# Patient Record
Sex: FEMALE | Race: WHITE | URBAN - METROPOLITAN AREA
[De-identification: names, ages, dates, MRNs, and addresses within clinical notes are randomized per-mention and may not be internally consistent; named-entity substitution may affect disease eponyms.]

---

## 2017-01-27 ENCOUNTER — IMPORTED ENCOUNTER (OUTPATIENT)
Dept: URBAN - METROPOLITAN AREA CLINIC 31 | Facility: CLINIC | Age: 72
End: 2017-01-27

## 2017-01-27 PROBLEM — H47.012: Noted: 2017-01-27

## 2017-01-27 PROBLEM — H04.121: Noted: 2017-01-27

## 2017-01-27 PROBLEM — Z96.1: Noted: 2017-01-27

## 2017-01-27 PROBLEM — H01.002: Noted: 2017-01-27

## 2017-01-27 PROBLEM — H25.812: Noted: 2017-01-27

## 2017-01-27 PROBLEM — H01.005: Noted: 2017-01-27

## 2017-01-27 PROBLEM — H01.001: Noted: 2017-01-27

## 2017-01-27 PROBLEM — H01.004: Noted: 2017-01-27

## 2017-01-27 PROBLEM — H26.491: Noted: 2017-01-27

## 2017-01-27 PROBLEM — H18.51: Noted: 2017-01-27

## 2017-01-27 PROBLEM — Z94.7: Noted: 2017-01-27

## 2017-01-27 PROCEDURE — 99213 OFFICE O/P EST LOW 20 MIN: CPT

## 2017-02-08 ENCOUNTER — IMPORTED ENCOUNTER (OUTPATIENT)
Dept: URBAN - METROPOLITAN AREA CLINIC 31 | Facility: CLINIC | Age: 72
End: 2017-02-08

## 2017-02-08 PROCEDURE — 92015 DETERMINE REFRACTIVE STATE: CPT

## 2017-02-08 PROCEDURE — 99080 SPECIAL REPORTS OR FORMS: CPT

## 2017-07-20 ENCOUNTER — IMPORTED ENCOUNTER (OUTPATIENT)
Dept: URBAN - METROPOLITAN AREA CLINIC 31 | Facility: CLINIC | Age: 72
End: 2017-07-20

## 2017-07-20 PROBLEM — H43.811: Noted: 2017-07-20

## 2017-07-20 PROBLEM — H47.012: Noted: 2017-07-20

## 2017-07-20 PROBLEM — Z96.1: Noted: 2017-07-20

## 2017-07-20 PROBLEM — H25.12: Noted: 2017-07-20

## 2017-07-20 PROBLEM — Z94.7: Noted: 2017-07-20

## 2017-07-20 PROCEDURE — 99214 OFFICE O/P EST MOD 30 MIN: CPT

## 2017-09-28 ENCOUNTER — IMPORTED ENCOUNTER (OUTPATIENT)
Dept: URBAN - METROPOLITAN AREA CLINIC 31 | Facility: CLINIC | Age: 72
End: 2017-09-28

## 2017-09-28 PROBLEM — G45.3: Noted: 2017-09-28

## 2017-09-28 PROBLEM — Z94.7: Noted: 2017-09-28

## 2017-09-28 PROBLEM — H25.12: Noted: 2017-09-28

## 2017-09-28 PROBLEM — Z96.1: Noted: 2017-09-28

## 2017-09-28 PROBLEM — H43.811: Noted: 2017-09-28

## 2017-09-28 PROBLEM — H47.012: Noted: 2017-09-28

## 2017-09-28 PROBLEM — H18.51: Noted: 2017-09-28

## 2017-09-28 PROCEDURE — 99214 OFFICE O/P EST MOD 30 MIN: CPT

## 2018-01-25 ENCOUNTER — IMPORTED ENCOUNTER (OUTPATIENT)
Dept: URBAN - METROPOLITAN AREA CLINIC 31 | Facility: CLINIC | Age: 73
End: 2018-01-25

## 2018-01-25 PROBLEM — Z96.1: Noted: 2018-01-25

## 2018-01-25 PROBLEM — H25.812: Noted: 2018-01-25

## 2018-01-25 PROBLEM — H18.51: Noted: 2018-01-25

## 2018-01-25 PROBLEM — H43.811: Noted: 2018-01-25

## 2018-01-25 PROBLEM — H47.012: Noted: 2018-01-25

## 2018-01-25 PROBLEM — Z94.7: Noted: 2018-01-25

## 2018-01-25 PROCEDURE — 99213 OFFICE O/P EST LOW 20 MIN: CPT

## 2018-01-25 PROCEDURE — 92250 FUNDUS PHOTOGRAPHY W/I&R: CPT

## 2019-04-05 ENCOUNTER — IMPORTED ENCOUNTER (OUTPATIENT)
Dept: URBAN - METROPOLITAN AREA CLINIC 31 | Facility: CLINIC | Age: 74
End: 2019-04-05

## 2019-04-05 PROBLEM — H25.812: Noted: 2019-04-05

## 2019-04-05 PROBLEM — Z94.7: Noted: 2019-04-05

## 2019-04-05 PROBLEM — H47.012: Noted: 2019-04-05

## 2019-04-05 PROBLEM — Z96.1: Noted: 2019-04-05

## 2019-04-05 PROBLEM — H43.811: Noted: 2019-04-05

## 2019-04-05 PROBLEM — H18.51: Noted: 2019-04-05

## 2019-04-05 PROCEDURE — 99214 OFFICE O/P EST MOD 30 MIN: CPT

## 2019-04-05 PROCEDURE — 92015 DETERMINE REFRACTIVE STATE: CPT

## 2019-05-06 ENCOUNTER — IMPORTED ENCOUNTER (OUTPATIENT)
Dept: URBAN - METROPOLITAN AREA CLINIC 31 | Facility: CLINIC | Age: 74
End: 2019-05-06

## 2019-05-06 PROBLEM — H47.012: Noted: 2019-05-06

## 2019-05-06 PROBLEM — Z94.7: Noted: 2019-05-06

## 2019-05-06 PROBLEM — H25.812: Noted: 2019-05-06

## 2019-05-06 PROBLEM — H43.811: Noted: 2019-05-06

## 2019-05-06 PROBLEM — Z96.1: Noted: 2019-05-06

## 2019-05-06 PROCEDURE — 99214 OFFICE O/P EST MOD 30 MIN: CPT

## 2019-05-13 ENCOUNTER — NEW PATIENT (OUTPATIENT)
Dept: URBAN - METROPOLITAN AREA CLINIC 26 | Facility: CLINIC | Age: 74
End: 2019-05-13

## 2019-05-13 VITALS
SYSTOLIC BLOOD PRESSURE: 137 MMHG | BODY MASS INDEX: 17.42 KG/M2 | HEIGHT: 67 IN | HEART RATE: 58 BPM | DIASTOLIC BLOOD PRESSURE: 77 MMHG | WEIGHT: 111 LBS

## 2019-05-13 DIAGNOSIS — H25.22: ICD-10-CM

## 2019-05-13 DIAGNOSIS — H04.123: ICD-10-CM

## 2019-05-13 DIAGNOSIS — H35.371: ICD-10-CM

## 2019-05-13 DIAGNOSIS — H02.836: ICD-10-CM

## 2019-05-13 DIAGNOSIS — H47.012: ICD-10-CM

## 2019-05-13 DIAGNOSIS — H02.833: ICD-10-CM

## 2019-05-13 DIAGNOSIS — H43.393: ICD-10-CM

## 2019-05-13 DIAGNOSIS — Z94.7: ICD-10-CM

## 2019-05-13 PROCEDURE — 92134 CPTRZ OPH DX IMG PST SGM RTA: CPT

## 2019-05-13 PROCEDURE — 92225 OPHTHALMOSCOPY (INITIAL): CPT

## 2019-05-13 PROCEDURE — 76512 OPH US DX B-SCAN: CPT

## 2019-05-13 PROCEDURE — 99204 OFFICE O/P NEW MOD 45 MIN: CPT

## 2019-05-13 ASSESSMENT — TONOMETRY
OD_IOP_MMHG: 11
OS_IOP_MMHG: 11

## 2019-05-13 ASSESSMENT — VISUAL ACUITY: OD_SC: 20/20

## 2019-08-08 ENCOUNTER — IMPORTED ENCOUNTER (OUTPATIENT)
Dept: URBAN - METROPOLITAN AREA CLINIC 31 | Facility: CLINIC | Age: 74
End: 2019-08-08

## 2019-08-08 PROBLEM — H25.812: Noted: 2019-08-08

## 2019-08-08 PROBLEM — H47.012: Noted: 2019-08-08

## 2019-08-08 PROBLEM — Z94.7: Noted: 2019-08-08

## 2019-08-08 PROBLEM — H11.31: Noted: 2019-08-08

## 2019-08-08 PROBLEM — H18.51: Noted: 2019-08-08

## 2019-08-08 PROCEDURE — 99213 OFFICE O/P EST LOW 20 MIN: CPT

## 2020-02-28 ENCOUNTER — IMPORTED ENCOUNTER (OUTPATIENT)
Dept: URBAN - METROPOLITAN AREA CLINIC 31 | Facility: CLINIC | Age: 75
End: 2020-02-28

## 2020-02-28 PROBLEM — H25.812: Noted: 2020-02-28

## 2020-02-28 PROBLEM — H47.012: Noted: 2020-02-28

## 2020-02-28 PROBLEM — H43.811: Noted: 2020-02-28

## 2020-02-28 PROBLEM — Z96.1: Noted: 2020-02-28

## 2020-02-28 PROBLEM — Z94.7: Noted: 2020-02-28

## 2020-02-28 PROCEDURE — 92015 DETERMINE REFRACTIVE STATE: CPT

## 2020-02-28 PROCEDURE — 99214 OFFICE O/P EST MOD 30 MIN: CPT

## 2020-06-29 ENCOUNTER — IMPORTED ENCOUNTER (OUTPATIENT)
Dept: URBAN - METROPOLITAN AREA CLINIC 31 | Facility: CLINIC | Age: 75
End: 2020-06-29

## 2020-06-29 PROBLEM — H47.012: Noted: 2020-06-29

## 2020-06-29 PROBLEM — H43.811: Noted: 2020-06-29

## 2020-06-29 PROBLEM — Z94.7: Noted: 2020-06-29

## 2020-06-29 PROBLEM — Z96.1: Noted: 2020-06-29

## 2020-06-29 PROBLEM — H25.812: Noted: 2020-06-29

## 2020-06-29 PROCEDURE — 99213 OFFICE O/P EST LOW 20 MIN: CPT

## 2020-10-23 ENCOUNTER — IMPORTED ENCOUNTER (OUTPATIENT)
Dept: URBAN - METROPOLITAN AREA CLINIC 31 | Facility: CLINIC | Age: 75
End: 2020-10-23

## 2020-10-23 PROBLEM — H25.812: Noted: 2020-10-23

## 2020-10-23 PROBLEM — Z96.1: Noted: 2020-10-23

## 2020-10-23 PROBLEM — H18.512: Noted: 2020-10-23

## 2020-10-23 PROBLEM — H47.012: Noted: 2020-10-23

## 2020-10-23 PROBLEM — H50.10: Noted: 2020-10-23

## 2020-10-23 PROBLEM — Z94.7: Noted: 2020-10-23

## 2020-10-23 PROCEDURE — 92015 DETERMINE REFRACTIVE STATE: CPT

## 2020-10-23 PROCEDURE — 99213 OFFICE O/P EST LOW 20 MIN: CPT

## 2021-04-23 ENCOUNTER — IMPORTED ENCOUNTER (OUTPATIENT)
Dept: URBAN - METROPOLITAN AREA CLINIC 31 | Facility: CLINIC | Age: 76
End: 2021-04-23

## 2021-04-23 PROBLEM — H25.812: Noted: 2021-04-23

## 2021-04-23 PROBLEM — H50.10: Noted: 2021-04-23

## 2021-04-23 PROBLEM — H18.512: Noted: 2021-04-23

## 2021-04-23 PROBLEM — H47.012: Noted: 2021-04-23

## 2021-04-23 PROBLEM — Z94.7: Noted: 2021-04-23

## 2021-04-23 PROBLEM — Z96.1: Noted: 2021-04-23

## 2021-04-23 PROCEDURE — 92014 COMPRE OPH EXAM EST PT 1/>: CPT

## 2022-02-02 ENCOUNTER — HOSPITAL ENCOUNTER (EMERGENCY)
Dept: HOSPITAL 82 - ED | Age: 77
Discharge: HOME | End: 2022-02-02
Payer: MEDICARE

## 2022-02-02 VITALS — WEIGHT: 101.41 LBS | HEIGHT: 66 IN | BODY MASS INDEX: 16.3 KG/M2

## 2022-02-02 VITALS — SYSTOLIC BLOOD PRESSURE: 132 MMHG | DIASTOLIC BLOOD PRESSURE: 68 MMHG
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
## 2022-02-02 DIAGNOSIS — G20: ICD-10-CM

## 2022-02-02 DIAGNOSIS — S81.812A: Primary | ICD-10-CM

## 2022-02-02 DIAGNOSIS — F02.80: ICD-10-CM

## 2022-02-02 DIAGNOSIS — I10: ICD-10-CM

## 2022-02-02 DIAGNOSIS — Z85.038: ICD-10-CM

## 2022-02-02 PROCEDURE — 0HQLXZZ REPAIR LEFT LOWER LEG SKIN, EXTERNAL APPROACH: ICD-10-PCS | Performed by: FAMILY MEDICINE

## 2022-04-02 ASSESSMENT — TONOMETRY
OS_IOP_MMHG: 10
OD_IOP_MMHG: 8
OS_IOP_MMHG: 13
OS_IOP_MMHG: 13
OS_IOP_MMHG: 12
OD_IOP_MMHG: 11
OD_IOP_MMHG: 14
OD_IOP_MMHG: 12
OS_IOP_MMHG: 12
OD_IOP_MMHG: 13
OD_IOP_MMHG: 11
OD_IOP_MMHG: 10
OD_IOP_MMHG: 11
OS_IOP_MMHG: 11
OS_IOP_MMHG: 12
OD_IOP_MMHG: 12
OD_IOP_MMHG: 12
OS_IOP_MMHG: 12
OS_IOP_MMHG: 11
OS_IOP_MMHG: 10
OD_IOP_MMHG: 14

## 2022-04-02 ASSESSMENT — VISUAL ACUITY
OD_CC: J2
OD_SC: 20/25+2
OD_CC: 20/20-3
OD_SC: 20/20-3
OD_CC: 20/25-3
OD_SC: 20/100
OD_CC: 20/20-3
OD_CC: 20/20-1
OD_CC: 20/20-2
OD_CC: 20/25
OD_CC: 20/20-2
OD_CC: 20/20-1
OD_SC: 20/25+1
OD_CC: 20/25+2
OD_SC: 20/30
OD_CC: 20/25
OD_CC: 20/30

## 2022-12-17 ENCOUNTER — HOSPITAL ENCOUNTER (INPATIENT)
Dept: HOSPITAL 82 - ED | Age: 77
LOS: 6 days | Discharge: NURSING, REHAB OR LONG-TERM CARE | DRG: 57 | End: 2022-12-23
Attending: INTERNAL MEDICINE | Admitting: INTERNAL MEDICINE
Payer: MEDICARE

## 2022-12-17 VITALS — DIASTOLIC BLOOD PRESSURE: 70 MMHG | SYSTOLIC BLOOD PRESSURE: 157 MMHG

## 2022-12-17 VITALS — DIASTOLIC BLOOD PRESSURE: 66 MMHG | SYSTOLIC BLOOD PRESSURE: 168 MMHG

## 2022-12-17 VITALS — SYSTOLIC BLOOD PRESSURE: 152 MMHG | DIASTOLIC BLOOD PRESSURE: 133 MMHG

## 2022-12-17 VITALS — DIASTOLIC BLOOD PRESSURE: 66 MMHG | SYSTOLIC BLOOD PRESSURE: 184 MMHG

## 2022-12-17 VITALS — SYSTOLIC BLOOD PRESSURE: 179 MMHG | DIASTOLIC BLOOD PRESSURE: 79 MMHG

## 2022-12-17 VITALS — WEIGHT: 101.41 LBS | HEIGHT: 66 IN | BODY MASS INDEX: 16.3 KG/M2

## 2022-12-17 VITALS — SYSTOLIC BLOOD PRESSURE: 158 MMHG | DIASTOLIC BLOOD PRESSURE: 88 MMHG

## 2022-12-17 VITALS — DIASTOLIC BLOOD PRESSURE: 80 MMHG | SYSTOLIC BLOOD PRESSURE: 180 MMHG

## 2022-12-17 VITALS — DIASTOLIC BLOOD PRESSURE: 60 MMHG | SYSTOLIC BLOOD PRESSURE: 140 MMHG

## 2022-12-17 DIAGNOSIS — Z85.038: ICD-10-CM

## 2022-12-17 DIAGNOSIS — S09.90XA: ICD-10-CM
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
## 2022-12-17 DIAGNOSIS — G20: ICD-10-CM
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
## 2022-12-17 DIAGNOSIS — Z20.822: ICD-10-CM

## 2022-12-17 DIAGNOSIS — G31.83: Primary | ICD-10-CM

## 2022-12-17 DIAGNOSIS — H54.62: ICD-10-CM

## 2022-12-17 DIAGNOSIS — I10: ICD-10-CM

## 2022-12-17 DIAGNOSIS — F02.C11: ICD-10-CM

## 2022-12-17 LAB
ALBUMIN SERPL-MCNC: 4.2 G/DL (ref 3.2–5)
ALP SERPL-CCNC: 56 U/L (ref 38–126)
ANION GAP SERPL CALCULATED.3IONS-SCNC: 9 MMOL/L
AST SERPL-CCNC: 36 U/L (ref 9–36)
BASOPHILS NFR BLD AUTO: 0 % (ref 0–3)
BILIRUB UR QL STRIP.AUTO: NEGATIVE
BUN SERPL-MCNC: 26 MG/DL (ref 8–23)
BUN/CREAT SERPL: 39
CHLORIDE SERPL-SCNC: 104 MMOL/L (ref 95–108)
CO2 SERPL-SCNC: 28 MMOL/L (ref 22–30)
COLOR UR AUTO: YELLOW
CREAT SERPL-MCNC: 0.7 MG/DL (ref 0.5–1)
EOSINOPHIL NFR BLD AUTO: 2 % (ref 0–8)
ERYTHROCYTE [DISTWIDTH] IN BLOOD BY AUTOMATED COUNT: 14.1 % (ref 11.5–15.5)
GLUCOSE UR STRIP.AUTO-MCNC: NEGATIVE MG/DL
HCT VFR BLD AUTO: 45 % (ref 37–47)
HGB BLD-MCNC: 15 G/DL (ref 12–16)
HGB UR QL STRIP.AUTO: NEGATIVE
IMM GRANULOCYTES NFR BLD: 0.6 % (ref 0–5)
KETONES UR STRIP.AUTO-MCNC: (no result) MG/DL
LEUKOCYTE ESTERASE UR QL STRIP.AUTO: NEGATIVE
LYMPHOCYTES NFR BLD: 12 % (ref 15–41)
MCH RBC QN AUTO: 30.5 PG  CALC (ref 26–32)
MCHC RBC AUTO-ENTMCNC: 33.3 G/DL CAL (ref 32–36)
MCV RBC AUTO: 91.6 FL  CALC (ref 80–100)
MONOCYTES NFR BLD AUTO: 7 % (ref 2–13)
NEUTROPHILS # BLD AUTO: 6.27 THOU/UL (ref 2–7.15)
NEUTROPHILS NFR BLD AUTO: 79 % (ref 42–76)
NITRITE UR QL STRIP.AUTO: NEGATIVE
PH UR STRIP.AUTO: 7.5 [PH] (ref 4.5–8)
PLATELET # BLD AUTO: 196 THOU/UL (ref 130–400)
POTASSIUM SERPL-SCNC: 4.6 MMOL/L (ref 3.5–5.1)
PROT SERPL-MCNC: 6.7 G/DL (ref 6.3–8.2)
PROT UR QL STRIP.AUTO: (no result) MG/DL
RBC # BLD AUTO: 4.91 MILL/UL (ref 4.2–5.6)
SODIUM SERPL-SCNC: 136 MMOL/L (ref 137–146)
SP GR UR STRIP.AUTO: 1.02
UROBILINOGEN UR QL STRIP.AUTO: 0.2 E.U./DL

## 2022-12-17 PROCEDURE — G0378 HOSPITAL OBSERVATION PER HR: HCPCS

## 2022-12-17 NOTE — NUR
PATIENT RESTING IN BED-AWAKE ALERT ORIENTED TO PERSON, PLACE AND BIRTHDATE.
BP-180/80- MANUAL. MEDICATED WITH APRESOLINE 10MG IVP AS ORDER FOR HTN VIA LAC
IV SITE. SITE IS HEALTHY WITH BLOOD RFETURN. IVF NS HUNG AND INFUSING AT
75CC/HR. MEDICATED FOR GENERALIZED PAIN WITH ULTRAM 50MG PO. PATIENT WAS ABLE
TO TAKE MED WITHOUT ANY DIFFICULTY WITH WATER. NO COUGH OR DIFFICULTY
SWALLOWING AT THIS TIME. PATIENT WITH TELE MONITOR IN PLACE- SR-70'S. LUNGS
ARE CLEAR. ABD IS SOFT WITH ACTIVE BS. PATIENT WITH EQUAL HAND GRASPS AND ABLE
TO MOVE ALL EXTREMITIES. HIGH ANXIETY AT TIMES. 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
NEW. NO PERIPHERAL EDEMA NOTED AND PULSES ARE PALPABLE. SAFETY PRECAUTIONS
REINFORCED. BED ALARM IN PLACE FOR PATIENT SAFETY. CALL LIGHT IN REACH. WILL
CONT TO MONITOR.

## 2022-12-17 NOTE — NUR
PATIENT RESTING IN BED-DAUGHTER AT BEDSIDE. BP IS DOWN-140/60. STATES THAT SHE
NEEDS TO URINATE AND MAX ASSIST OOB TO THE BSC. PATIENT WITH VERY SPASTIC
MOVEMENT OF ARMS AND LEGS. PATIENT WITH HX OF PARKINSONS. WAS ABLE TO VOID
MODERATE AMT OF YELLOW URINE AND ASSISTED BACK TO BED. BED ALARM IN PLACE FOR
PATIENT SAFETY. SIDERAILS ARE PADDED FOR PATIENT SAFETY. CALL LIGHT IN REACH.
WILL CONT TO MONITOR.

## 2022-12-17 NOTE — NUR
Admission Note
 
Report Given to:         HARSHIL CLEMONS
Transported by:           Wheelchair          X Stretcher
 
Transported with:        X Nurse     Transporter   X Patent IV    O2
                         X Cardiac Monitor
 
Location:                 ICU      X MS2
 
 
          
 
         TO ROOM 262.

## 2022-12-18 VITALS — SYSTOLIC BLOOD PRESSURE: 140 MMHG | DIASTOLIC BLOOD PRESSURE: 60 MMHG

## 2022-12-18 VITALS — SYSTOLIC BLOOD PRESSURE: 159 MMHG | DIASTOLIC BLOOD PRESSURE: 71 MMHG

## 2022-12-18 VITALS — DIASTOLIC BLOOD PRESSURE: 62 MMHG | SYSTOLIC BLOOD PRESSURE: 148 MMHG

## 2022-12-18 VITALS — SYSTOLIC BLOOD PRESSURE: 148 MMHG | DIASTOLIC BLOOD PRESSURE: 62 MMHG

## 2022-12-18 VITALS — DIASTOLIC BLOOD PRESSURE: 71 MMHG | SYSTOLIC BLOOD PRESSURE: 159 MMHG

## 2022-12-18 NOTE — NUR
PATIENT IS SITTING UP IN BED EATING. NO SXS OF DISTRESS. NO CONCERNS OR
QUESTIONS FROM PATIENT AT THIS TIME . ADVISED TO CALL IF NEEDING ANYTHING.
PATIENT VERBALIZED UNDERSTANDING.

## 2022-12-18 NOTE — NUR
PATIENT RESTING IN BED AT THIS TIME WITH DAUGHTER AT BEDSIDE. EYES ARE CLOSED
AND RESPS ARE EVEN AND UNLABORED. IVFF PATENT AND INFUSING VIA LEFT FOREARM
SITE AT 75CC/HR. TELE MONITOR IN PLACE WITH LAST READING SR-90'S. BED ALARM IN
PLACE FOR PATIENT SAFETY. CALL LIGHT IN REACH. WILL CONT TO BERNARD.

## 2022-12-18 NOTE — NUR
PATIENT IRVING RESTLESS AND AGITATED IN BED WITH DAUGHTERS AT BEDSIDE. STATES
THAT SHE HAS BEEN LIKE THIS ALL DAY WITH HALLUCINATIONS AND THRASHING ABOUT IN
THE BED. MEDICATED PATIENT WITH SINEMET AS ORDERED AND HEPARIN SQ. IV SITE TO
LEFT FOREARM REMAINS INTACT AND HEALTHY. TELE MONITOR REAPPLIED AS LEADS WERE
DISCONNECTED DUE TO RESTLESS ACTIVITY. PATIENT IS ORIENTED TO PERSON AND
BIRTHDATE ONLY. PATIENT IS MOSTLY CONFUSED TO PLACE AND TIME-SPEAKING
INAPPROPRIATELY. FAMILY REMAINS AT BEDSIDE. BED ALARM IN PLACE FOR PATIENT
SAFETY. CALL LIGHT IN REACH. WILL CONT TO MONITOR.

## 2022-12-18 NOTE — NUR
patient is starting to get aggitated. keep going in and having her lay down.
she is unsteady on her feet. bed alarm is on and notified nurse.

## 2022-12-18 NOTE — NUR
PATIENT RESTING IN BED-REMAINS EXTREMELY AGITATED, HAVING HALLUCINATIONS AND
SEVERE TREMORS OF HANDS. VERY RESTLESS. MEDICATED WITH HALDOL 2MG IVP AS
ORDERED FOR AGITATION VIA LEFT FOREARM IV SITE. SITE REMAINS HEALTHY WITH GOOD
BLOOD RETURN. TELE MONITOR REMAINS IN PLACE. BED ALARM IN PLACE FOR PATIENT
SAFETY. CALL LIGHT IN REACH. WILL CONT TO MONITOR.

## 2022-12-18 NOTE — NUR
GOT REPORT FROM NIGHT SHIFT NURSE. PATIENT ASSESSED. AOX4. PATIENT HAS FALL
PRECAUTIONS IN PLACE. CALL LIGHT AND BED SIDE TABLE WITH IN REACH. ADVISED TO
CALL IF NEEDING ANYTHING.

## 2022-12-18 NOTE — NUR
PATIENT CONT TO BE EXTREMELY AGITATED. THRASHING ABOUT IN THE BED.
HALLUCINATING AND PICKING AT LINENS AND IN SPACE. SPEAKING OUT WITHOUT MAKING
ANY SENSE. CALL PLACE TO DR. PERES AND NEW ORDERS RECIEVED. MEDICATED WITH
ATIVAN 1MG IVP VIA LEFT FOREARM SITE. SITE REMAINS HEALTHY WITH GOOD BLOOD
RETURN. INCONT OF MODERATE AMT OF U RINE AND PERICARE PROVIDED. NEW BREIF WAS
APPLIED. PATIENT CURRENTLY RESTING MORE QUIETLY. BED ALARM INP LACE FOR
PATIENT SAFETY. CALL LIGHT IN REACH. WILL CONT TO MONITOR.

## 2022-12-18 NOTE — NUR
PATIENT RESTING IN BED-IV FOUND DISLODGED WITH CATH INTACT. NEW IV STARTED TO
LEFT FOREARM-#22 GAUGE WITH GOOD BLOOD RETURN. IVF NS INFUSING AT 75CC/HR.
TELE MONITOR IN PLACE. DAUGHTER REMAINS AT BEDSIDE FOR PATIENT SAFETY. BED
ALARM IN PLACE FOR PATIENT SAFETY. CALL LIGHT IN REACH. WILL CONT TO MONITOR.

## 2022-12-18 NOTE — NUR
PATIENT RESTING IN BED WITH DAUGHTER AT BEDSIDE. EYES ARE CLOSED AND RESPS ARE
EVEN AND UNLABORED. IVF PATENT ANDINFUSING VIA LAC SITE AT 75CC/HR. SITE
REMAINS HEALTHY AT THIS TIME. TELE MONITOR IN PLACE WITH LAST READING SR-73
BED ALARM IN PLACE FOR PATIENT SAFETY. CALL LIGHT IN REACH. WILL CONT TO
MONITOR.

## 2022-12-19 VITALS — DIASTOLIC BLOOD PRESSURE: 101 MMHG | SYSTOLIC BLOOD PRESSURE: 145 MMHG

## 2022-12-19 VITALS — DIASTOLIC BLOOD PRESSURE: 96 MMHG | SYSTOLIC BLOOD PRESSURE: 121 MMHG

## 2022-12-19 VITALS — SYSTOLIC BLOOD PRESSURE: 113 MMHG | DIASTOLIC BLOOD PRESSURE: 47 MMHG

## 2022-12-19 VITALS — DIASTOLIC BLOOD PRESSURE: 80 MMHG | SYSTOLIC BLOOD PRESSURE: 114 MMHG

## 2022-12-19 VITALS — SYSTOLIC BLOOD PRESSURE: 176 MMHG | DIASTOLIC BLOOD PRESSURE: 78 MMHG

## 2022-12-19 VITALS — SYSTOLIC BLOOD PRESSURE: 137 MMHG | DIASTOLIC BLOOD PRESSURE: 55 MMHG

## 2022-12-19 VITALS — SYSTOLIC BLOOD PRESSURE: 121 MMHG | DIASTOLIC BLOOD PRESSURE: 96 MMHG

## 2022-12-19 LAB
ALBUMIN SERPL-MCNC: 3.8 G/DL (ref 3.2–5)
ALP SERPL-CCNC: 57 U/L (ref 38–126)
ANION GAP SERPL CALCULATED.3IONS-SCNC: 9 MMOL/L
AST SERPL-CCNC: 39 U/L (ref 9–36)
BASOPHILS NFR BLD AUTO: 0 % (ref 0–3)
BUN SERPL-MCNC: 17 MG/DL (ref 8–23)
BUN/CREAT SERPL: 30
CHLORIDE SERPL-SCNC: 108 MMOL/L (ref 95–108)
CO2 SERPL-SCNC: 25 MMOL/L (ref 22–30)
CREAT SERPL-MCNC: 0.6 MG/DL (ref 0.5–1)
EOSINOPHIL NFR BLD AUTO: 2 % (ref 0–8)
ERYTHROCYTE [DISTWIDTH] IN BLOOD BY AUTOMATED COUNT: 13.8 % (ref 11.5–15.5)
HCT VFR BLD AUTO: 44 % (ref 37–47)
HGB BLD-MCNC: 14.7 G/DL (ref 12–16)
IMM GRANULOCYTES NFR BLD: 0.2 % (ref 0–5)
LYMPHOCYTES NFR BLD: 11 % (ref 15–41)
MCH RBC QN AUTO: 30.5 PG  CALC (ref 26–32)
MCHC RBC AUTO-ENTMCNC: 33.4 G/DL CAL (ref 32–36)
MCV RBC AUTO: 91.3 FL  CALC (ref 80–100)
MONOCYTES NFR BLD AUTO: 9 % (ref 2–13)
NEUTROPHILS # BLD AUTO: 4.96 THOU/UL (ref 2–7.15)
NEUTROPHILS NFR BLD AUTO: 78 % (ref 42–76)
PLATELET # BLD AUTO: 184 THOU/UL (ref 130–400)
POTASSIUM SERPL-SCNC: 4 MMOL/L (ref 3.5–5.1)
PROT SERPL-MCNC: 5.8 G/DL (ref 6.3–8.2)
RBC # BLD AUTO: 4.82 MILL/UL (ref 4.2–5.6)
SODIUM SERPL-SCNC: 137 MMOL/L (ref 137–146)

## 2022-12-19 NOTE — NUR
RECIEVED BEDSIDE REPORT FROM DAYSHIFT NURSE. PT IS VERY AGITATED AND CONFUSED.
PT TRYING TO CLIIMB OUT OF BED, TAKING OFF TELE MONITOR, PULLING AT IV. IV
SITE WAS WRAPPED WITH COBAN. BED ALARM ON AND SAFETY PRECAUTIONS IN PLACE
INFORMED  OF PT BEHAVIOR. MEDICATION ORDERED THROUGH [facility] AND FAXED
TO PHARMACY. PT DAUGHTER WAS INFORMED OF CURRENT BEHAVIOR AND REQUESTED
MEDICATION NOT BE USED. PT BEHAVIOR UNCHANGED AT THIS MOMENT. DAUGHTER IN ROOM
WITH PT. SAFETY PRECAUTIONS IN PLACE AND WILL CONTINUE TO MONITOR PT.

## 2022-12-19 NOTE — NUR
PATIENT RESTING IN BED WITH EYES CLOSED. RESPS ARE EVEN AND UNLABORED. MUCH
CALMER AT THIS TIME. TELE MONITOR IN PLACE. BED ALARM IN PLACE FOR PATIENT
SAFETY. CALL LIGHT IN REACH. WILL CONT TO MONITOR.

## 2022-12-19 NOTE — NUR
PT HAS BEEN TRANSPORTED TO MRI. PT REQUIRES ASSISTANCE FROM BED TO W/C. PT
DAUGHTER IS WITH DURING TRANSPORT AND IS ALSO WAITING IN MRI.

## 2022-12-19 NOTE — NUR
PT BECOMING EXTREMELY AGITATED, PT WILL BE MEDICATED PROPERLY. PT HAS BED
ALARM ACTIVE FOR FURTHER SAFETY. WILL CONTINUE TON MONITOR.

## 2022-12-19 NOTE — NUR
PT IS IN BED EYES CLOSED AND HAVING HALLUCINATIONS. PT UNABLE TO ANSWER
ORIENTATION QUESTIONS, AND FOLLOWS MINIMAL COMMANDS, PT HAS NO S/SX OF PAIN,
SOB. PT VS ASSESSED. PT IS ON FALL PRECAUTIONS, BED ALARM ACTIVE. WILL
CONTINUE TO MONITOR.

## 2022-12-19 NOTE — NUR
PATIENT CONTINUES TO REST QUIETLY IN BED. EYES CLOSED AND RESPS ARE EVEN AND
UNLBAORED. TELE MONITOR IN PLACE. IVF NS PATENT AND INFUSING VIA LEFT FOREARM
SITE AT 75CC/HR. BED ALARM IN PLACE FOR PATIENT SAFETY. CALL LIGHT IN REACH.
WILL CONT TO MONITOR.

## 2022-12-19 NOTE — NUR
PT IS IN BED FIDGETING IN BED WITH BLANKETS, AND GOWN. PT NOT FOLLOWING BASIC
COMMANDS AT THIS TIME. PT IS ON FALL PRECAUTIONS AND BED ALARM IS ACTIVE.
DAUGHTER IS AT BEDSIDE.

## 2022-12-20 VITALS — SYSTOLIC BLOOD PRESSURE: 153 MMHG | DIASTOLIC BLOOD PRESSURE: 72 MMHG

## 2022-12-20 VITALS — SYSTOLIC BLOOD PRESSURE: 154 MMHG | DIASTOLIC BLOOD PRESSURE: 69 MMHG

## 2022-12-20 VITALS — SYSTOLIC BLOOD PRESSURE: 142 MMHG | DIASTOLIC BLOOD PRESSURE: 70 MMHG

## 2022-12-20 VITALS — DIASTOLIC BLOOD PRESSURE: 79 MMHG | SYSTOLIC BLOOD PRESSURE: 186 MMHG

## 2022-12-20 VITALS — DIASTOLIC BLOOD PRESSURE: 72 MMHG | SYSTOLIC BLOOD PRESSURE: 153 MMHG

## 2022-12-20 VITALS — DIASTOLIC BLOOD PRESSURE: 74 MMHG | SYSTOLIC BLOOD PRESSURE: 111 MMHG

## 2022-12-20 VITALS — DIASTOLIC BLOOD PRESSURE: 65 MMHG | SYSTOLIC BLOOD PRESSURE: 144 MMHG

## 2022-12-20 LAB
ALBUMIN SERPL-MCNC: 4.2 G/DL (ref 3.2–5)
ALP SERPL-CCNC: 64 U/L (ref 38–126)
ANION GAP SERPL CALCULATED.3IONS-SCNC: 12 MMOL/L
AST SERPL-CCNC: 44 U/L (ref 9–36)
BASOPHILS NFR BLD AUTO: 0 % (ref 0–3)
BUN SERPL-MCNC: 18 MG/DL (ref 8–23)
BUN/CREAT SERPL: 32
CHLORIDE SERPL-SCNC: 106 MMOL/L (ref 95–108)
CO2 SERPL-SCNC: 28 MMOL/L (ref 22–30)
CREAT SERPL-MCNC: 0.6 MG/DL (ref 0.5–1)
EOSINOPHIL NFR BLD AUTO: 3 % (ref 0–8)
ERYTHROCYTE [DISTWIDTH] IN BLOOD BY AUTOMATED COUNT: 14 % (ref 11.5–15.5)
HCT VFR BLD AUTO: 44.5 % (ref 37–47)
HGB BLD-MCNC: 14.9 G/DL (ref 12–16)
IMM GRANULOCYTES NFR BLD: 0 % (ref 0–5)
LYMPHOCYTES NFR BLD: 15 % (ref 15–41)
MCH RBC QN AUTO: 31 PG  CALC (ref 26–32)
MCHC RBC AUTO-ENTMCNC: 33.5 G/DL CAL (ref 32–36)
MCV RBC AUTO: 92.7 FL  CALC (ref 80–100)
MONOCYTES NFR BLD AUTO: 10 % (ref 2–13)
NEUTROPHILS # BLD AUTO: 4.27 THOU/UL (ref 2–7.15)
NEUTROPHILS NFR BLD AUTO: 72 % (ref 42–76)
PLATELET # BLD AUTO: 201 THOU/UL (ref 130–400)
POTASSIUM SERPL-SCNC: 3.8 MMOL/L (ref 3.5–5.1)
PROT SERPL-MCNC: 6.6 G/DL (ref 6.3–8.2)
RBC # BLD AUTO: 4.8 MILL/UL (ref 4.2–5.6)
SODIUM SERPL-SCNC: 143 MMOL/L (ref 137–146)

## 2022-12-20 NOTE — NUR
PT HAS BEEN RESTLESS, DAUGHTER ARRIVED AND PT HAS SEEMED TO BECOME MORE
RELAXED. PT HAS SITTER NEARBY, PT HAS BED ALARM IN PLACE. WILL CONTINUE TO
MONITOR.

## 2022-12-20 NOTE — NUR
PT LAYING SUPINE IN BED. DAUGHTER IN ROOM AT BEDISDE WITH PT. PT STILL
CONFUSED WITH MUMBLED SPEECH BUT NO SIGNS OF HIGH ANXIETY. NO S/S OF DISTRESS
AND DENIES ANY PAIN AT THIS TIME. SAFETY PRECAUTIONS IN PLACE AND CALL LIGHT
WITHIN REACH.

## 2022-12-20 NOTE — NUR
PT IN BED SLEEPING LAYING SUPINE. NO S/S OF DISCOMOFRT. DAUGHTER AT BEDSIDE.
[place]LEY LIGHT WITHIN REACH AND SAFETY PRECAUTIONS IN PLACE.

## 2022-12-20 NOTE — NUR
PT IS SITTING UP IN BED, AND TRYING TO CONTINUOUSLY GET OOB, SPOKE TO NP AND
AWAITING ORDERS. PT IS UNABLE TO ANSWER ORIENTATION QUESTIONS, NOT FOLLOWING
COMMANDS AND HAS BEEN REPOSITIONED IN BED, PT HAS BEEN PLACED ON FALL
PRECAUTIONS AND BED ALARM IS ACTIVE, SITTER IS AT BEDSIDE. WILL CONTINUE TO
MONITOR.

## 2022-12-20 NOTE — NUR
PT BECOMING RESTLESS, AND IS CLIMBING OVER BED RAILS, SITTER AND DAUGHTER
PRESENT. PT HAS BEEN MEDICATED PER DR ORDER. PT IS BEING MONITORED.

## 2022-12-20 NOTE — NUR
Pt in bed in iyer position, CNA taking vs. She was unable to answer any
questions, She was talking some but difficult to understand. Pt not following
commands and became agitated with movement. Pt not appropriate for treatment
at this time.

## 2022-12-21 VITALS — DIASTOLIC BLOOD PRESSURE: 60 MMHG | SYSTOLIC BLOOD PRESSURE: 103 MMHG

## 2022-12-21 VITALS — DIASTOLIC BLOOD PRESSURE: 102 MMHG | SYSTOLIC BLOOD PRESSURE: 118 MMHG

## 2022-12-21 VITALS — SYSTOLIC BLOOD PRESSURE: 103 MMHG | DIASTOLIC BLOOD PRESSURE: 60 MMHG

## 2022-12-21 VITALS — DIASTOLIC BLOOD PRESSURE: 61 MMHG | SYSTOLIC BLOOD PRESSURE: 130 MMHG

## 2022-12-21 VITALS — DIASTOLIC BLOOD PRESSURE: 79 MMHG | SYSTOLIC BLOOD PRESSURE: 186 MMHG

## 2022-12-21 VITALS — DIASTOLIC BLOOD PRESSURE: 65 MMHG | SYSTOLIC BLOOD PRESSURE: 132 MMHG

## 2022-12-21 VITALS — SYSTOLIC BLOOD PRESSURE: 156 MMHG | DIASTOLIC BLOOD PRESSURE: 58 MMHG

## 2022-12-21 VITALS — SYSTOLIC BLOOD PRESSURE: 130 MMHG | DIASTOLIC BLOOD PRESSURE: 61 MMHG

## 2022-12-21 LAB
ALBUMIN SERPL-MCNC: 4.1 G/DL (ref 3.2–5)
ALP SERPL-CCNC: 64 U/L (ref 38–126)
ANION GAP SERPL CALCULATED.3IONS-SCNC: 14 MMOL/L
AST SERPL-CCNC: 39 U/L (ref 9–36)
BASOPHILS NFR BLD AUTO: 0.3 % (ref 0–3)
BUN SERPL-MCNC: 24 MG/DL (ref 8–23)
BUN/CREAT SERPL: 33
CHLORIDE SERPL-SCNC: 107 MMOL/L (ref 95–108)
CO2 SERPL-SCNC: 24 MMOL/L (ref 22–30)
CREAT SERPL-MCNC: 0.7 MG/DL (ref 0.5–1)
EOSINOPHIL NFR BLD AUTO: 2.4 % (ref 0–8)
ERYTHROCYTE [DISTWIDTH] IN BLOOD BY AUTOMATED COUNT: 14.1 % (ref 11.5–15.5)
HCT VFR BLD AUTO: 47.6 % (ref 37–47)
HGB BLD-MCNC: 15.8 G/DL (ref 12–16)
IMM GRANULOCYTES NFR BLD: 0.1 % (ref 0–5)
LYMPHOCYTES NFR BLD: 13.2 % (ref 15–41)
MCH RBC QN AUTO: 30.8 PG  CALC (ref 26–32)
MCHC RBC AUTO-ENTMCNC: 33.2 G/DL CAL (ref 32–36)
MCV RBC AUTO: 92.8 FL  CALC (ref 80–100)
MONOCYTES NFR BLD AUTO: 9.9 % (ref 2–13)
NEUTROPHILS # BLD AUTO: 4.95 THOU/UL (ref 2–7.15)
NEUTROPHILS NFR BLD AUTO: 74.1 % (ref 42–76)
PLATELET # BLD AUTO: 186 THOU/UL (ref 130–400)
POTASSIUM SERPL-SCNC: 4.3 MMOL/L (ref 3.5–5.1)
PROT SERPL-MCNC: 6.4 G/DL (ref 6.3–8.2)
RBC # BLD AUTO: 5.13 MILL/UL (ref 4.2–5.6)
SODIUM SERPL-SCNC: 141 MMOL/L (ref 137–146)

## 2022-12-21 NOTE — NUR
Attempted treatment this am. Daughter at bedside and reported this was the
first pt had slept all week. Treatment held at this time.

## 2022-12-21 NOTE — NUR
PT SLEEPING, BREATHING REMAINS THE SAME. DAUGHTER AT BEDSIDE. TELE IN PLACE.
PT COVERED IN A WARM BLANKET. DENIES ANY NEEDS AT THIS TIME. ALL SAFETY
PRECAUTIONS IN PLACE AT THIS TIME

## 2022-12-21 NOTE — NUR
PT IN BED SLEEPING. DAUGHTER AT BEDSIDE. TELE IN PLACE. PT RESTLESS AND
SPEAKING TO PEOPLE ONLY THE PT CAN SEE. PT DENIES PAIN. ALL SAFETY PRECAUTIONS
IN PLACE AT THIS TIME.

## 2022-12-21 NOTE — NUR
PATIENT RESTING IN BED. RESPS ARE EVEN AND UNLABORED. PATIENT AGITATED SITTER
AT BEDSIDE. TELE MONITOR IN PLACE. BED ALARM IN PLACE FOR PATIENT SAFETY. CALL
LIGHT IN REACH. WILL CONT MONITOR. SITTER AT BED SIDE

## 2022-12-21 NOTE — NUR
PT RESTING IN BED. STATES NO PAIN. TELE MONITOR IN PLACE. CONTINOUS MONITORING
FALL/SAFTEY PRECAUTION IN PLACE, CALL LIGHT WITHIN REACH.

## 2022-12-21 NOTE — NUR
REPORT FROM SHERYL IS RECEIVED IN. ALERT AND ORIENTED PATIENT X1. RESTING IN
BED WITH EYES CLOSED. PATIENT WITH SITTER'S ORDER FOR FALL PRECAUTION. SAFETY
AND FALL PRECAUTIONS IN PLACE. CALL LIGHT WITHIN IN REACH.

## 2022-12-21 NOTE — NUR
PT TELE READING MAINTAINING IN THE 120s and HIGH ONE TEENS. PT ALSO RESTLESS
AND CALLING OUT.  INFORMED RN ON UNIT AND WAS TOLD TO MEDICATE PT WITH ATIVAN
EARLY.

## 2022-12-21 NOTE — NUR
SPEECH PATHOLOGY--
SLP ATTEMPTED TO SEE PT. NOT ABLE TO WAKE UP IN AM. WILL CONTINUE TO FOLLOW
FOR DIET TOLERANCE.

## 2022-12-22 VITALS — DIASTOLIC BLOOD PRESSURE: 75 MMHG | SYSTOLIC BLOOD PRESSURE: 145 MMHG

## 2022-12-22 LAB
ALBUMIN SERPL-MCNC: 4.1 G/DL (ref 3.2–5)
ALP SERPL-CCNC: 64 U/L (ref 38–126)
ANION GAP SERPL CALCULATED.3IONS-SCNC: 15 MMOL/L
AST SERPL-CCNC: 32 U/L (ref 9–36)
BASOPHILS NFR BLD AUTO: 0.3 % (ref 0–3)
BUN SERPL-MCNC: 43 MG/DL (ref 8–23)
BUN/CREAT SERPL: 57
CHLORIDE SERPL-SCNC: 108 MMOL/L (ref 95–108)
CO2 SERPL-SCNC: 26 MMOL/L (ref 22–30)
CREAT SERPL-MCNC: 0.8 MG/DL (ref 0.5–1)
EOSINOPHIL NFR BLD AUTO: 2.5 % (ref 0–8)
ERYTHROCYTE [DISTWIDTH] IN BLOOD BY AUTOMATED COUNT: 14.4 % (ref 11.5–15.5)
HCT VFR BLD AUTO: 45.2 % (ref 37–47)
HGB BLD-MCNC: 15.4 G/DL (ref 12–16)
IMM GRANULOCYTES NFR BLD: 0.2 % (ref 0–5)
LYMPHOCYTES NFR BLD: 19.6 % (ref 15–41)
MCH RBC QN AUTO: 31.4 PG  CALC (ref 26–32)
MCHC RBC AUTO-ENTMCNC: 34.1 G/DL CAL (ref 32–36)
MCV RBC AUTO: 92.1 FL  CALC (ref 80–100)
MONOCYTES NFR BLD AUTO: 10 % (ref 2–13)
NEUTROPHILS # BLD AUTO: 3.98 THOU/UL (ref 2–7.15)
NEUTROPHILS NFR BLD AUTO: 67.4 % (ref 42–76)
PLATELET # BLD AUTO: 212 THOU/UL (ref 130–400)
POTASSIUM SERPL-SCNC: 4 MMOL/L (ref 3.5–5.1)
PROT SERPL-MCNC: 6.6 G/DL (ref 6.3–8.2)
RBC # BLD AUTO: 4.91 MILL/UL (ref 4.2–5.6)
SODIUM SERPL-SCNC: 144 MMOL/L (ref 137–146)

## 2022-12-22 NOTE — NUR
BEDSIDE SHIFT REPORT, PT SLEELPING BUT AROUSES TO TACTILE STIMULI, CONFUSED,
NO SIGN DISCOMFORT, BREATHING EVEN AND NON-LABORED, TELE MONITOR IN PLACE,
SITTER IN ROOM, BED LOCKED IN LOWEST POSITOIN WITH CALL BELL IN REACH AND
ALARM ACTIVATED.

## 2022-12-22 NOTE — NUR
AGITATED AND RESTLESS AT 1200 REMOVING CLOTHING, NEW MED ORDEERED AND GIVEN,
WILL CONTINUE TO MONITOR.

## 2022-12-22 NOTE — NUR
Pt sleeping with CNA present. She reported 2 person assist to bath pt this am
secondary to pt restlessness. Pt was trying to get out of bed with them.
Treatment held secondary to sleeping and inability to participate.

## 2022-12-22 NOTE — NUR
PT IN BED WITH EYES CLOSED.  UNABLE TO GET VITALS DUE RESISTANT AND SNATCHING
AWAY FROM NURSE AND TECH.  DAUGHTER AT BEDSIDE.  DR MCQUEEN CALLED REGARDING
INCREASED RESTLESSNESS, AGITATION AND CLINBING OUT OF BED.  aWAITING ORDERS.
WILL CONTINUE TO OBSERVE

## 2022-12-23 VITALS — DIASTOLIC BLOOD PRESSURE: 60 MMHG | SYSTOLIC BLOOD PRESSURE: 148 MMHG

## 2022-12-23 VITALS — SYSTOLIC BLOOD PRESSURE: 151 MMHG | DIASTOLIC BLOOD PRESSURE: 68 MMHG

## 2022-12-23 VITALS — SYSTOLIC BLOOD PRESSURE: 128 MMHG | DIASTOLIC BLOOD PRESSURE: 54 MMHG

## 2022-12-23 VITALS — SYSTOLIC BLOOD PRESSURE: 148 MMHG | DIASTOLIC BLOOD PRESSURE: 60 MMHG

## 2022-12-23 LAB
ALBUMIN SERPL-MCNC: 4 G/DL (ref 3.2–5)
ALP SERPL-CCNC: 64 U/L (ref 38–126)
ANION GAP SERPL CALCULATED.3IONS-SCNC: 11 MMOL/L
AST SERPL-CCNC: 26 U/L (ref 9–36)
BASOPHILS NFR BLD AUTO: 0.4 % (ref 0–3)
BUN SERPL-MCNC: 46 MG/DL (ref 8–23)
BUN/CREAT SERPL: 68
CHLORIDE SERPL-SCNC: 109 MMOL/L (ref 95–108)
CO2 SERPL-SCNC: 26 MMOL/L (ref 22–30)
CREAT SERPL-MCNC: 0.7 MG/DL (ref 0.5–1)
EOSINOPHIL NFR BLD AUTO: 3.4 % (ref 0–8)
ERYTHROCYTE [DISTWIDTH] IN BLOOD BY AUTOMATED COUNT: 14.1 % (ref 11.5–15.5)
HCT VFR BLD AUTO: 47 % (ref 37–47)
HGB BLD-MCNC: 15.8 G/DL (ref 12–16)
IMM GRANULOCYTES NFR BLD: 0 % (ref 0–5)
LYMPHOCYTES NFR BLD: 17.6 % (ref 15–41)
MCH RBC QN AUTO: 31.2 PG  CALC (ref 26–32)
MCHC RBC AUTO-ENTMCNC: 33.6 G/DL CAL (ref 32–36)
MCV RBC AUTO: 92.9 FL  CALC (ref 80–100)
MONOCYTES NFR BLD AUTO: 9.3 % (ref 2–13)
NEUTROPHILS # BLD AUTO: 3.5 THOU/UL (ref 2–7.15)
NEUTROPHILS NFR BLD AUTO: 69.3 % (ref 42–76)
PLATELET # BLD AUTO: 220 THOU/UL (ref 130–400)
POTASSIUM SERPL-SCNC: 3.9 MMOL/L (ref 3.5–5.1)
PROT SERPL-MCNC: 6.5 G/DL (ref 6.3–8.2)
RBC # BLD AUTO: 5.06 MILL/UL (ref 4.2–5.6)
SODIUM SERPL-SCNC: 142 MMOL/L (ref 137–146)

## 2022-12-23 NOTE — NUR
pT IN BED WITH EYES CLOSED.  NO S/S OF DISTRESS DURING THE NIGHT.  DAUGHTER AT
BEDSIDE.  INCONTINENCE CARE PROVIDED AND POSITION CHANGES THROUGHOUT THE
NIGHT.  BED IN LOW POSITION AND BED ALARM ON FOR POOR SAFETY AWARENESS AND
FALL PREVENTION.  WILL CONTINUE TO OBSERVE

## 2023-01-04 ENCOUNTER — HOSPITAL ENCOUNTER (EMERGENCY)
Dept: HOSPITAL 82 - ED | Age: 78
Discharge: HOME | End: 2023-01-04
Payer: MEDICARE

## 2023-01-04 VITALS — DIASTOLIC BLOOD PRESSURE: 83 MMHG | SYSTOLIC BLOOD PRESSURE: 147 MMHG

## 2023-01-04 VITALS — SYSTOLIC BLOOD PRESSURE: 133 MMHG | DIASTOLIC BLOOD PRESSURE: 111 MMHG

## 2023-01-04 VITALS — DIASTOLIC BLOOD PRESSURE: 61 MMHG | SYSTOLIC BLOOD PRESSURE: 79 MMHG

## 2023-01-04 VITALS — WEIGHT: 83.78 LBS | HEIGHT: 66 IN | BODY MASS INDEX: 13.46 KG/M2

## 2023-01-04 VITALS — DIASTOLIC BLOOD PRESSURE: 67 MMHG | SYSTOLIC BLOOD PRESSURE: 120 MMHG

## 2023-01-04 VITALS — SYSTOLIC BLOOD PRESSURE: 157 MMHG | DIASTOLIC BLOOD PRESSURE: 94 MMHG

## 2023-01-04 VITALS — DIASTOLIC BLOOD PRESSURE: 73 MMHG | SYSTOLIC BLOOD PRESSURE: 132 MMHG

## 2023-01-04 VITALS — SYSTOLIC BLOOD PRESSURE: 161 MMHG | DIASTOLIC BLOOD PRESSURE: 79 MMHG

## 2023-01-04 VITALS — DIASTOLIC BLOOD PRESSURE: 78 MMHG | SYSTOLIC BLOOD PRESSURE: 97 MMHG
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
## 2023-01-04 DIAGNOSIS — I10: ICD-10-CM

## 2023-01-04 DIAGNOSIS — G20: ICD-10-CM
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
## 2023-01-04 DIAGNOSIS — S70.02XA: ICD-10-CM

## 2023-01-04 DIAGNOSIS — S01.01XA: Primary | ICD-10-CM

## 2023-01-04 DIAGNOSIS — I48.91: ICD-10-CM

## 2023-01-04 DIAGNOSIS — F02.80: ICD-10-CM

## 2023-08-08 ENCOUNTER — HOSPITAL ENCOUNTER (EMERGENCY)
Dept: HOSPITAL 82 - ED | Age: 78
Discharge: NURSING, REHAB OR LONG-TERM CARE | End: 2023-08-08
Payer: MEDICARE

## 2023-08-08 VITALS — SYSTOLIC BLOOD PRESSURE: 181 MMHG | DIASTOLIC BLOOD PRESSURE: 74 MMHG

## 2023-08-08 VITALS — DIASTOLIC BLOOD PRESSURE: 63 MMHG | SYSTOLIC BLOOD PRESSURE: 139 MMHG

## 2023-08-08 VITALS — SYSTOLIC BLOOD PRESSURE: 163 MMHG | DIASTOLIC BLOOD PRESSURE: 94 MMHG

## 2023-08-08 VITALS — DIASTOLIC BLOOD PRESSURE: 74 MMHG | SYSTOLIC BLOOD PRESSURE: 166 MMHG

## 2023-08-08 VITALS — SYSTOLIC BLOOD PRESSURE: 143 MMHG | DIASTOLIC BLOOD PRESSURE: 112 MMHG

## 2023-08-08 VITALS — SYSTOLIC BLOOD PRESSURE: 149 MMHG | DIASTOLIC BLOOD PRESSURE: 76 MMHG

## 2023-08-08 VITALS — DIASTOLIC BLOOD PRESSURE: 77 MMHG | SYSTOLIC BLOOD PRESSURE: 142 MMHG

## 2023-08-08 VITALS — SYSTOLIC BLOOD PRESSURE: 153 MMHG | DIASTOLIC BLOOD PRESSURE: 73 MMHG

## 2023-08-08 VITALS — SYSTOLIC BLOOD PRESSURE: 163 MMHG | DIASTOLIC BLOOD PRESSURE: 71 MMHG

## 2023-08-08 VITALS — DIASTOLIC BLOOD PRESSURE: 68 MMHG | SYSTOLIC BLOOD PRESSURE: 143 MMHG

## 2023-08-08 VITALS — DIASTOLIC BLOOD PRESSURE: 78 MMHG | SYSTOLIC BLOOD PRESSURE: 163 MMHG

## 2023-08-08 VITALS — SYSTOLIC BLOOD PRESSURE: 150 MMHG | DIASTOLIC BLOOD PRESSURE: 75 MMHG

## 2023-08-08 VITALS — SYSTOLIC BLOOD PRESSURE: 162 MMHG | DIASTOLIC BLOOD PRESSURE: 87 MMHG

## 2023-08-08 VITALS — SYSTOLIC BLOOD PRESSURE: 141 MMHG | DIASTOLIC BLOOD PRESSURE: 77 MMHG

## 2023-08-08 VITALS — SYSTOLIC BLOOD PRESSURE: 153 MMHG | DIASTOLIC BLOOD PRESSURE: 67 MMHG

## 2023-08-08 VITALS — DIASTOLIC BLOOD PRESSURE: 76 MMHG | SYSTOLIC BLOOD PRESSURE: 168 MMHG

## 2023-08-08 VITALS — SYSTOLIC BLOOD PRESSURE: 167 MMHG | DIASTOLIC BLOOD PRESSURE: 75 MMHG

## 2023-08-08 VITALS — DIASTOLIC BLOOD PRESSURE: 77 MMHG | SYSTOLIC BLOOD PRESSURE: 150 MMHG

## 2023-08-08 VITALS — DIASTOLIC BLOOD PRESSURE: 75 MMHG | SYSTOLIC BLOOD PRESSURE: 161 MMHG

## 2023-08-08 VITALS — DIASTOLIC BLOOD PRESSURE: 122 MMHG | SYSTOLIC BLOOD PRESSURE: 160 MMHG

## 2023-08-08 VITALS — DIASTOLIC BLOOD PRESSURE: 77 MMHG | SYSTOLIC BLOOD PRESSURE: 159 MMHG

## 2023-08-08 VITALS — SYSTOLIC BLOOD PRESSURE: 157 MMHG | DIASTOLIC BLOOD PRESSURE: 85 MMHG

## 2023-08-08 VITALS — HEIGHT: 66 IN | WEIGHT: 93.92 LBS | BODY MASS INDEX: 15.09 KG/M2

## 2023-08-08 VITALS — DIASTOLIC BLOOD PRESSURE: 74 MMHG | SYSTOLIC BLOOD PRESSURE: 155 MMHG

## 2023-08-08 DIAGNOSIS — G20: ICD-10-CM

## 2023-08-08 DIAGNOSIS — N39.0: Primary | ICD-10-CM

## 2023-08-08 DIAGNOSIS — I48.91: ICD-10-CM

## 2023-08-08 DIAGNOSIS — U07.1: ICD-10-CM

## 2023-08-08 DIAGNOSIS — F02.80: ICD-10-CM

## 2023-08-08 DIAGNOSIS — B96.20: ICD-10-CM

## 2023-08-08 DIAGNOSIS — I10: ICD-10-CM

## 2023-08-08 LAB
ALBUMIN SERPL-MCNC: 3.4 G/DL (ref 3.2–5)
ALP SERPL-CCNC: 56 U/L (ref 38–126)
ANION GAP SERPL CALCULATED.3IONS-SCNC: 10 MMOL/L
AST SERPL-CCNC: 44 U/L (ref 9–36)
BACTERIA #/AREA URNS HPF: (no result) HPF
BASOPHILS NFR BLD AUTO: 0.5 % (ref 0–3)
BUN SERPL-MCNC: 32 MG/DL (ref 8–23)
BUN/CREAT SERPL: 53
CHLORIDE SERPL-SCNC: 102 MMOL/L (ref 95–108)
CO2 SERPL-SCNC: 32 MMOL/L (ref 22–30)
CREAT SERPL-MCNC: 0.6 MG/DL (ref 0.5–1)
EOSINOPHIL NFR BLD AUTO: 1.4 % (ref 0–8)
ERYTHROCYTE [DISTWIDTH] IN BLOOD BY AUTOMATED COUNT: 13.6 % (ref 11.5–15.5)
HCT VFR BLD AUTO: 43.9 % (ref 37–47)
HGB BLD-MCNC: 14.3 G/DL (ref 12–16)
IMM GRANULOCYTES NFR BLD: 0 % (ref 0–5)
KETONES UR STRIP.AUTO-MCNC: 40 MG/DL
LYMPHOCYTES NFR BLD: 20.5 % (ref 15–41)
MCH RBC QN AUTO: 31.1 PG  CALC (ref 26–32)
MCHC RBC AUTO-ENTMCNC: 32.6 G/DL CAL (ref 32–36)
MCV RBC AUTO: 95.4 FL  CALC (ref 80–100)
MONOCYTES NFR BLD AUTO: 9.3 % (ref 2–13)
NEUTROPHILS # BLD AUTO: 2.49 THOU/UL (ref 2–7.15)
NEUTROPHILS NFR BLD AUTO: 68.3 % (ref 42–76)
PH UR STRIP.AUTO: 6 [PH] (ref 4.5–8)
PLATELET # BLD AUTO: 142 THOU/UL (ref 130–400)
POTASSIUM SERPL-SCNC: 3.8 MMOL/L (ref 3.5–5.1)
PROT SERPL-MCNC: 6.6 G/DL (ref 6.3–8.2)
PROT UR QL STRIP.AUTO: 30 MG/DL
RBC # BLD AUTO: 4.6 MILL/UL (ref 4.2–5.6)
RBC #/AREA URNS HPF: (no result) RBC/HPF (ref 0–5)
SODIUM SERPL-SCNC: 140 MMOL/L (ref 137–146)
SP GR UR STRIP.AUTO: 1.02
SQUAMOUS URNS QL MICRO: (no result) EPI/HPF
UROBILINOGEN UR QL STRIP.AUTO: 1 E.U./DL
WBC #/AREA URNS HPF: (no result) WBC/HPF (ref 0–5)

## 2024-03-30 NOTE — NUR
NEUROLOGY CONSULT NOTE    ADMISSION DATE:  3/29/2024  CONSULTING PHYSICIAN:  Óscar Woodward MD  REQUESTING PHYSICIAN: No ref. provider found  ATTENDING PHYSICIAN:  Keyana Lamb MD  CODE STATUS:  Full Resuscitation      CHIEF COMPLAINT:  vision changes  QUESTION FOR CONSULTATION: same    SUBJECTIVE:  Jammie Dinero is a 84 year old female w pmhx notable for Parkionsons, HTN, who presents after a brief episode of blurred vision.  She described that when she looked in the mirror her face looked blurry.  She did not appreciate any focality of this blurred, rather she describes it as generalized.  She estimates that this lasted about 5-10 minutes before improving.  She describes a similar episode about 1-2 weeks ago.  He denied any other symptoms, including no double vision, no change in speech/language, no change in mental status, no unilateral sensory/motor complaints/changes.  She endorses compliance with aspirin daily.    HISTORIES:  ALLERGIES:   Allergen Reactions    Lactose   (Food Or Med) GI UPSET and Nausea & Vomiting     Other reaction(s): GI Upset/Nausea/Vomiting    Pollen Extract Other (See Comments)       Current Facility-Administered Medications   Medication Dose Route Frequency Provider Last Rate Last Admin    LORazepam (ATIVAN) tablet 1 mg  1 mg Oral PRN Óscar Woodward MD   1 mg at 03/29/24 2154    atorvastatin (LIPITOR) tablet 20 mg  20 mg Oral Daily Afshan, Shakeema A, CNP   20 mg at 03/30/24 0930    carbidopa-levodopa (SINEMET CR)  MG per CR tablet 2 tablet  2 tablet Oral 6x Daily Afshan, Shakeema A, CNP   2 tablet at 03/30/24 0930    ferrous sulfate (65 mg Fe per 325 mg) tablet 325 mg  325 mg Oral Daily with breakfast Afshan, Shakeema A, CNP   325 mg at 03/30/24 0930    sertraline (ZOLOFT) tablet 25 mg  25 mg Oral Daily Afshan, Shakeema A, CNP   25 mg at 03/30/24 0930    acetaminophen (TYLENOL) tablet 650 mg  650 mg Oral Q6H PRN Afshan, Shakeema A, CNP        Or    acetaminophen (TYLENOL)  TELECART IN ROOM PER MD ORDER. suppository 650 mg  650 mg Rectal Q6H PRN Dl Cavanaughema A, CNP        sodium chloride 0.9 % flush bag 25 mL  25 mL Intravenous PRN AfshanDlema FANTA, CNP        sodium chloride 0.9 % injection 2 mL  2 mL Intracatheter 2 times per day Afshan, Braden JEWELL, CNP   2 mL at 03/30/24 0932    aspirin (ECOTRIN) enteric coated tablet 81 mg  81 mg Oral Daily AfshanDlema A, CNP   81 mg at 03/30/24 0930    Potassium Standard Replacement Protocol (Levels 3.5 and lower)   Does not apply See Admin Instructions Braden Cavanaugh, CNP        Magnesium Standard Replacement Protocol   Does not apply See Admin Instructions Braden Cavanaugh, CNP        melatonin tablet 3 mg  3 mg Oral Nightly PRN Braden Cavanaugh, ROSSANA        ondansetron (ZOFRAN) injection 4 mg  4 mg Intravenous Q6H PRN Afshan, Dlema A, CNP           Medications Prior to Admission   Medication Sig Dispense Refill    CALCIUM PO Take 600 mg by mouth daily.      azelastine (ASTELIN) 0.1 % nasal spray Spray 1 spray in each nostril 2 times daily as needed for Rhinitis.      erythromycin (ILOTYCIN) ophthalmic ointment Place 1 inch into both eyes nightly.      sertraline (ZOLOFT) 25 MG tablet Take 25 mg by mouth daily.      cholecalciferol (VITAMIN D) 25 mcg(1,000 units) tablet Take 25 mcg by mouth daily.      vitamin B-12 (CYANOCOBALAMIN) 1000 MCG tablet Take 1,000 mcg by mouth daily.      DENOSumab (PROLIA) 60 MG/ML Solution Prefilled Syringe Inject 60 mg into the skin every 6 months.      aspirin (ECOTRIN) 81 MG EC tablet Take 1 tablet by mouth daily.      atorvastatin (LIPITOR) 20 MG tablet Take 20 mg by mouth daily.      carbidopa-levodopa (SINEMET CR)  MG per CR tablet Take 2 tablets by mouth 6 times daily. Every 4 hours starting at 0800      ferrous sulfate 325 (65 FE) MG tablet Take 325 mg by mouth daily (with breakfast).      acetaminophen (TYLENOL) 325 MG tablet Take 650 mg by mouth every 4 hours as needed (moderate pain).         Past Medical History:    Diagnosis Date    Anemia     Arthritis     Asthma     Bronchitis     Fracture     High cholesterol     Parkinson's disease     Pneumonia     Sinusitis, chronic     Urinary tract infection        Past Surgical History:   Procedure Laterality Date    Appendectomy      Coronary angioplasty      Hip fracture surgery Right     Shoulder surgery Right     Total abdominal hysterectomy         Family History   Problem Relation Age of Onset    Heart disease Father        Social History     Tobacco Use    Smoking status: Never    Smokeless tobacco: Never   Vaping Use    Vaping Use: never used   Substance Use Topics    Alcohol use: Not Currently    Drug use: Not Currently       REVIEW OF SYSTEMS:  As reviewed per HPI    OBJECTIVE:  VITAL SIGNS:  Vital Last Value 24 Hour Range   Temperature 97.5 °F (36.4 °C) (03/30/24 0700) Temp  Min: 97.3 °F (36.3 °C)  Max: 98.4 °F (36.9 °C)   Pulse 82 (03/30/24 0700) Pulse  Min: 82  Max: 109   Respiratory 16 (03/29/24 1845) Resp  Min: 16  Max: 16   Non-Invasive  Blood Pressure 110/63 (03/30/24 0700) BP  Min: 110/63  Max: 189/93   Pulse Oximetry 96 % (03/30/24 0700) SpO2  Min: 96 %  Max: 100 %     Vital Today Admitted   Weight 51.6 kg (113 lb 12.1 oz) (03/29/24 2000) Weight: 53.6 kg (118 lb 2.7 oz) (03/29/24 1451)   Height N/A Height: 5' 6\" (167.6 cm) (03/29/24 1451)   BMI N/A BMI (Calculated): 19.07 (03/29/24 1451)     PHYSICAL EXAM:  GENERAL APPEARANCE: Patient is in no apparent distress.    NEUROLOGIC:  Mental status: Alert, aware, oriented x3.  Speech is clear, non-dysarthric.  Language is fluent and comprehensive to naming and commands.  Attention and insight appear normal.  Cranial nerves: Visual fields are full to finger counting in all quadrants.  Eye movements are smooth and conjugate in all directions.  Facial strength and sensation appear full and symmetric.  Palate elevates midline.  Tongue protrudes midline.  Strength: 5/5 in all extremities with no drift and no involuntary  movements.  Sensation: Intact to fine touch in all extremities.  Cerebellum: Finger-to-nose testing is normal bilaterally.  Gait: Deferred.        LABORATORY DATA:  Recent Results (from the past 24 hour(s))   Comprehensive Metabolic Panel    Collection Time: 03/29/24  3:33 PM   Result Value Ref Range    Fasting Status      Sodium 144 135 - 145 mmol/L    Potassium 4.5 3.4 - 5.1 mmol/L    Chloride 104 97 - 110 mmol/L    Carbon Dioxide 32 21 - 32 mmol/L    Anion Gap 13 7 - 19 mmol/L    Glucose 100 (H) 70 - 99 mg/dL    BUN 22 (H) 6 - 20 mg/dL    Creatinine 0.59 0.51 - 0.95 mg/dL    Glomerular Filtration Rate 88 >=60    BUN/Cr 37 (H) 7 - 25    Calcium 9.1 8.4 - 10.2 mg/dL    Bilirubin, Total 0.5 0.2 - 1.0 mg/dL    GOT/AST 21 <=37 Units/L    GPT/ALT 6 <64 Units/L    Alkaline Phosphatase 72 45 - 117 Units/L    Albumin 4.1 3.6 - 5.1 g/dL    Protein, Total 7.3 6.4 - 8.2 g/dL    Globulin 3.2 2.0 - 4.0 g/dL    A/G Ratio 1.3 1.0 - 2.4   Magnesium    Collection Time: 03/29/24  3:33 PM   Result Value Ref Range    Magnesium 2.1 1.7 - 2.4 mg/dL   Prothrombin Time (INR/PT)    Collection Time: 03/29/24  3:33 PM   Result Value Ref Range    Protime- PT 10.6 9.7 - 11.8 sec    INR 1.0     Partial Thromboplastin Time (PTT)    Collection Time: 03/29/24  3:33 PM   Result Value Ref Range    PTT 25 22 - 32 sec   TROPONIN I, HIGH SENSITIVITY    Collection Time: 03/29/24  3:33 PM   Result Value Ref Range    Troponin I, High Sensitivity 11 <52 ng/L   CBC with Automated Differential (performable only)    Collection Time: 03/29/24  3:33 PM   Result Value Ref Range    WBC 9.5 4.2 - 11.0 K/mcL    RBC 4.34 4.00 - 5.20 mil/mcL    HGB 14.0 12.0 - 15.5 g/dL    HCT 43.6 36.0 - 46.5 %    .5 (H) 78.0 - 100.0 fl    MCH 32.3 26.0 - 34.0 pg    MCHC 32.1 32.0 - 36.5 g/dL    RDW-CV 12.0 11.0 - 15.0 %    RDW-SD 44.7 39.0 - 50.0 fL     140 - 450 K/mcL    NRBC 0 <=0 /100 WBC    Neutrophil, Percent 87 %    Lymphocytes, Percent 6 %    Mono, Percent 7 %     Eosinophils, Percent 0 %    Basophils, Percent 0 %    Immature Granulocytes 0 %    Absolute Neutrophils 8.1 (H) 1.8 - 7.7 K/mcL    Absolute Lymphocytes 0.6 (L) 1.0 - 4.0 K/mcL    Absolute Monocytes 0.7 0.3 - 0.9 K/mcL    Absolute Eosinophils  0.0 0.0 - 0.5 K/mcL    Absolute Basophils 0.0 0.0 - 0.3 K/mcL    Absolute Immature Granulocytes 0.0 0.0 - 0.2 K/mcL   Electrocardiogram 12-Lead    Collection Time: 03/29/24  3:37 PM   Result Value Ref Range    Ventricular Rate EKG/Min (BPM) 92     Atrial Rate (BPM) 92     NJ-Interval (MSEC) 162     QRS-Interval (MSEC) 84     QT-Interval (MSEC) 376     QTc 465     P Axis (Degrees) 70     R Axis (Degrees) 73     T Axis (Degrees) 98     REPORT TEXT       Normal sinus rhythm  Possible  Left atrial enlargement  Minimal voltage criteria for LVH, may be normal variant  (  Sokolow-Agarwal  )  Possible  Anterior infarct  , age undetermined  Abnormal ECG  No previous ECGs available     Magnesium    Collection Time: 03/30/24  5:55 AM   Result Value Ref Range    Magnesium 1.8 1.7 - 2.4 mg/dL   Comprehensive Metabolic Panel    Collection Time: 03/30/24  5:55 AM   Result Value Ref Range    Fasting Status      Sodium 145 135 - 145 mmol/L    Potassium 3.9 3.4 - 5.1 mmol/L    Chloride 107 97 - 110 mmol/L    Carbon Dioxide 29 21 - 32 mmol/L    Anion Gap 13 7 - 19 mmol/L    Glucose 89 70 - 99 mg/dL    BUN 19 6 - 20 mg/dL    Creatinine 0.62 0.51 - 0.95 mg/dL    Glomerular Filtration Rate 87 >=60    BUN/Cr 31 (H) 7 - 25    Calcium 8.2 (L) 8.4 - 10.2 mg/dL    Bilirubin, Total 0.7 0.2 - 1.0 mg/dL    GOT/AST 18 <=37 Units/L    GPT/ALT <6 <64 Units/L    Alkaline Phosphatase 49 45 - 117 Units/L    Albumin 3.3 (L) 3.6 - 5.1 g/dL    Protein, Total 5.9 (L) 6.4 - 8.2 g/dL    Globulin 2.6 2.0 - 4.0 g/dL    A/G Ratio 1.3 1.0 - 2.4   CBC with Automated Differential (performable only)    Collection Time: 03/30/24  5:55 AM   Result Value Ref Range    WBC 6.9 4.2 - 11.0 K/mcL    RBC 4.10 4.00 - 5.20 mil/mcL     HGB 12.9 12.0 - 15.5 g/dL    HCT 40.8 36.0 - 46.5 %    MCV 99.5 78.0 - 100.0 fl    MCH 31.5 26.0 - 34.0 pg    MCHC 31.6 (L) 32.0 - 36.5 g/dL    RDW-CV 12.0 11.0 - 15.0 %    RDW-SD 43.7 39.0 - 50.0 fL     140 - 450 K/mcL    NRBC 0 <=0 /100 WBC    Neutrophil, Percent 79 %    Lymphocytes, Percent 11 %    Mono, Percent 8 %    Eosinophils, Percent 1 %    Basophils, Percent 1 %    Immature Granulocytes 0 %    Absolute Neutrophils 5.4 1.8 - 7.7 K/mcL    Absolute Lymphocytes 0.8 (L) 1.0 - 4.0 K/mcL    Absolute Monocytes 0.5 0.3 - 0.9 K/mcL    Absolute Eosinophils  0.1 0.0 - 0.5 K/mcL    Absolute Basophils 0.0 0.0 - 0.3 K/mcL    Absolute Immature Granulocytes 0.0 0.0 - 0.2 K/mcL   Lipid Panel With Reflex    Collection Time: 03/30/24  5:55 AM   Result Value Ref Range    Cholesterol 128 <=199 mg/dL    Triglycerides 28 <=149 mg/dL    HDL 67 >=50 mg/dL    LDL 55 <=129 mg/dL    Non-HDL Cholesterol 61 mg/dL    Cholesterol/ HDL Ratio 1.9 <=4.4        IMAGING STUDIES:  MRI BRAIN WO CONTRAST   Final Result by Stalin Alexis MD (03/29 6099)   No evidence of acute or remote cerebral infarction, obvious intracranial   mass or abnormal postcontrast enhancement.   Mild cerebral atrophy.   A few small foci of T2 and FLAIR hyperintensity in the periventricular and   subcortical white matter of the cerebrum are nonspecific, likely   age-related.         Electronically Signed by: STALIN ALEXIS M.D.    Signed on: 3/29/2024 10:55 PM    Workstation ID: [id number]      CT HEAD WO CONTRAST   Final Result by Stalin Alexis MD (03/29 9395)   Small hypodensity in the anterior aspect of the left basal   ganglia (series 2, image 24 and 25. This may represent recent or remote   lacunar infarction.         Electronically Signed by: STALIN ALEXIS M.D.    Signed on: 3/29/2024 5:41 PM    Workstation ID: [id number]      CTA HEAD AND NECK   Final Result by Stalin Alexis MD (03/29 1749)   1.  The brain CTA is unremarkable. No  evidence of intracranial vessel   stenosis, vascular malformation or aneurysm.   2. Mild calcific plaque bilateral carotid bifurcations and at the origins   of bilateral vertebral arteries without luminal narrowing.   3. Chronic degenerative changes cervical spine.. Mild to moderate central   canal stenosis C4-C5, C5-C6. Significant bilateral neural foraminal   narrowing C5-C6. Diffusely decreased bone density consistent with   osteoporosis. Mild remote compression fracture T5 vertebral body.      Electronically Signed by: BERENICE ALEXIS M.D.    Signed on: 3/29/2024 5:49 PM    Workstation ID: [id number]      XR CHEST PA OR AP 1 VIEW   Final Result by Nicole Lundberg MD (03/29 9766)   FINDINGS/IMPRESSION:      Scoliosis. Reverse left shoulder replacement.      Normal heart size. Atherosclerotic aorta. No mediastinal widening. No lung   consolidation or effusions.       Electronically Signed by: NICOLE LUNDBERG M.D.    Signed on: 3/29/2024 4:36 PM    Workstation ID: [id number]            ASSESSMENT/PLAN:  1.  Transient episode of generalized blurred vision    There is no appreciable focality to the vision change.  Symptoms lasted 5-10 minutes.  MRI brain and CT angio head and neck were unremarkable.  Semiology is not compelling for TIA.  Unclear etiology of symptoms.  I would not recommend any changes in the patient's current medications at this time.  Continue aspirin and statin.  From neurology standpoint, okay to discharge with routine clinic follow-up in 2-4 weeks.        Óscar Woodward MD  [facility]  Available directly via [employer]   I spent at least 65 min in the care of this patient including history taking & physical examination through face to face interaction with patient and patient surrogates, as well as review of lab/diagnostics data, multiple provider documents, chart records, counseling. Additional time was required for , documentation, and coordination of care  with various staff.      Note to Patient: The 21st Century Cures Act makes medical notes like these available to patients in the interest of transparency. However, be advised this is a medical document. It is intended as peer to peer communication. It is written in medical language and may contain abbreviations or verbiage that are unfamiliar. It may appear blunt or direct. Medical documents are intended to carry relevant information, facts as evident, and the clinical opinion of the practitioner.    This note used Dragon transcription technology. Transcription errors are not uncommon and may not have been corrected prior to electronically signing the note. Should you find these errors, please consult the clinician for interpretation.